# Patient Record
Sex: MALE | URBAN - METROPOLITAN AREA
[De-identification: names, ages, dates, MRNs, and addresses within clinical notes are randomized per-mention and may not be internally consistent; named-entity substitution may affect disease eponyms.]

---

## 2021-10-08 ENCOUNTER — OFFICE VISIT (OUTPATIENT)
Dept: URBAN - METROPOLITAN AREA CLINIC 6 | Facility: CLINIC | Age: 46
End: 2021-10-08
Payer: COMMERCIAL

## 2021-10-08 DIAGNOSIS — H04.123 TEAR FILM INSUFFICIENCY OF BILATERAL LACRIMAL GLANDS: ICD-10-CM

## 2021-10-08 DIAGNOSIS — H05.241 CONSTANT EXOPHTHALMOS, RIGHT EYE: Primary | ICD-10-CM

## 2021-10-08 PROCEDURE — 92004 COMPRE OPH EXAM NEW PT 1/>: CPT | Performed by: OPTOMETRIST

## 2021-10-08 ASSESSMENT — INTRAOCULAR PRESSURE
OD: 16
OS: 12

## 2021-10-08 NOTE — IMPRESSION/PLAN
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 Plan: Secondary to Graves disease. Discussed how this may cause some dry eye symptoms. It appears that the poorer vision in the right eye is unrelated. The patient happens to have mild myopia more in the right eye than the left, which is a coincidence.